# Patient Record
Sex: MALE | Race: OTHER | URBAN - METROPOLITAN AREA
[De-identification: names, ages, dates, MRNs, and addresses within clinical notes are randomized per-mention and may not be internally consistent; named-entity substitution may affect disease eponyms.]

---

## 2019-07-16 ENCOUNTER — OFFICE VISIT (OUTPATIENT)
Dept: FAMILY MEDICINE CLINIC | Age: 34
End: 2019-07-16

## 2019-07-16 VITALS
BODY MASS INDEX: 35.13 KG/M2 | HEART RATE: 85 BPM | WEIGHT: 205.8 LBS | DIASTOLIC BLOOD PRESSURE: 86 MMHG | OXYGEN SATURATION: 98 % | RESPIRATION RATE: 14 BRPM | TEMPERATURE: 99 F | SYSTOLIC BLOOD PRESSURE: 120 MMHG | HEIGHT: 64 IN

## 2019-07-16 DIAGNOSIS — N45.1 ACUTE EPIDIDYMITIS: Primary | ICD-10-CM

## 2019-07-16 DIAGNOSIS — R30.0 DYSURIA: ICD-10-CM

## 2019-07-16 DIAGNOSIS — N50.819 TESTICULAR PAIN: ICD-10-CM

## 2019-07-16 LAB
BILIRUBIN, POC: ABNORMAL
BLOOD URINE, POC: ABNORMAL
CLARITY, POC: ABNORMAL
COLOR, POC: ABNORMAL
GLUCOSE URINE, POC: ABNORMAL
KETONES, POC: ABNORMAL
LEUKOCYTE EST, POC: ABNORMAL
NITRITE, POC: ABNORMAL
PH, POC: 7
PROTEIN, POC: ABNORMAL
SPECIFIC GRAVITY, POC: 1.02
UROBILINOGEN, POC: ABNORMAL

## 2019-07-16 PROCEDURE — 81003 URINALYSIS AUTO W/O SCOPE: CPT | Performed by: NURSE PRACTITIONER

## 2019-07-16 PROCEDURE — 96372 THER/PROPH/DIAG INJ SC/IM: CPT | Performed by: NURSE PRACTITIONER

## 2019-07-16 PROCEDURE — 99213 OFFICE O/P EST LOW 20 MIN: CPT | Performed by: NURSE PRACTITIONER

## 2019-07-16 RX ORDER — CIPROFLOXACIN 500 MG/1
500 TABLET, FILM COATED ORAL 2 TIMES DAILY
Qty: 20 TABLET | Refills: 0 | Status: CANCELLED | OUTPATIENT
Start: 2019-07-16 | End: 2019-07-26

## 2019-07-16 RX ORDER — CEFTRIAXONE SODIUM 250 MG/1
250 INJECTION, POWDER, FOR SOLUTION INTRAMUSCULAR; INTRAVENOUS ONCE
Status: COMPLETED | OUTPATIENT
Start: 2019-07-16 | End: 2019-07-16

## 2019-07-16 RX ORDER — DOXYCYCLINE HYCLATE 100 MG
100 TABLET ORAL 2 TIMES DAILY
Qty: 20 TABLET | Refills: 0 | Status: SHIPPED | OUTPATIENT
Start: 2019-07-16 | End: 2019-07-26

## 2019-07-16 RX ORDER — LEVOFLOXACIN 500 MG/1
500 TABLET, FILM COATED ORAL DAILY
Qty: 10 TABLET | Refills: 0 | Status: CANCELLED | OUTPATIENT
Start: 2019-07-16 | End: 2019-07-26

## 2019-07-16 RX ADMIN — CEFTRIAXONE SODIUM 250 MG: 250 INJECTION, POWDER, FOR SOLUTION INTRAMUSCULAR; INTRAVENOUS at 10:56

## 2019-07-16 ASSESSMENT — ENCOUNTER SYMPTOMS
VOMITING: 0
ABDOMINAL PAIN: 1
SHORTNESS OF BREATH: 0
NAUSEA: 0

## 2019-07-16 NOTE — PROGRESS NOTES
Subjective  Ruy Ervin, 28 y.o. male presents today with:  Chief Complaint   Patient presents with    Dysuria     Pt c/o dysuria, left testicle pain, lower abdominal pain, hematuria, urinary frequency X 1 week. Treatments tried: naproxen with no relief. He is not currently sexually active. He was last sexually active 6 months ago. No discharge. Urinary Tract Infection    This is a new problem. The current episode started in the past 7 days. The problem has been gradually worsening. The pain is at a severity of 6/10. Maximum temperature: subjective fever on sat. He is not sexually active. Associated symptoms include chills, frequency, hematuria and urgency. Pertinent negatives include no discharge, flank pain, nausea or vomiting. Treatments tried: naprosyn. The treatment provided no relief. There is no history of recurrent UTIs. Patient's wife had chlamydia three months ago      No past medical history on file. No Known Allergies    No current outpatient medications on file prior to visit. No current facility-administered medications on file prior to visit. Review of Systems   Constitutional: Positive for chills and fever. Respiratory: Negative for shortness of breath. Cardiovascular: Negative for chest pain. Gastrointestinal: Positive for abdominal pain (lower abdominal pain). Negative for nausea and vomiting. Genitourinary: Positive for dysuria, frequency, hematuria, testicular pain and urgency. Negative for discharge, flank pain, penile pain, penile swelling and scrotal swelling. Objective    Vitals:    07/16/19 1007   BP: 120/86   Site: Right Upper Arm   Position: Sitting   Cuff Size: Medium Adult   Pulse: 85   Resp: 14   Temp: 99 °F (37.2 °C)   TempSrc: Oral   SpO2: 98%   Weight: 205 lb 12.8 oz (93.4 kg)   Height: 5' 3.78\" (1.62 m)       Physical Exam   Constitutional: He appears well-developed. No distress. HENT:   Head: Normocephalic and atraumatic.

## 2019-07-16 NOTE — PATIENT INSTRUCTIONS
actividad a lo que sea cómodo. · Use ropa interior ajustada o un suspensorio deportivo. [place] puede ayudar a reducir el dolor. · Aplíquese frío o calor en la gerald hinchada. Use el que le dé mejores resultados para salinas dolor. Sentarse en un baño de agua tibia por 15 minutos dos veces al día ayudará a reducir la hinchazón con Avonne Ream. · Si le peña dicho que soni STI pudo alessandra causado salinas afección, no tenga relaciones sexuales hasta que salinas médico le diga que ya es seguro. [place] impedirá que se propague la infección. Dígale a salinas charles o parejas sexuales que deben hacerse evaluar. Es posible que necesiten tratamiento. ¿Cuándo debe pedir ayuda? Llame a salinas médico ahora mismo o busque atención médica inmediata si:    · Salinas dolor empeora.     · Tiene fiebre nueva o más libia.     · Tiene nueva o mayor hinchazón en el testículo.     · Tiene un nuevo dolor abdominal o salinas dolor empeora.    Vigile de cerca los cambios en salinas jose francisco y asegúrese de comunicarse con salinas médico si:    · No mejora sanjuanita se esperaba. ¿Dónde puede encontrar más información en inglés? Nahomy Kid a https://ana[facility]. [facility] e ingrese a salinas cuenta de MyChart. Emily Goldman S360 en el cuadro \"Search Health Information\" para más información (en inglés) sobre \"Epididimitis y orquitis: Rohit Ray - [ Epididymitis and Orchitis: Care Instructions ]. \"     Si no tiene soni cuenta, neno sydnee en el enlace \"Sign Up Now\". Revisado: 19 juanpablo, 2018  Versión del contenido: 12.0  © 9335-8276 [employer]. Las instrucciones de cuidado fueron adaptadas bajo licencia por [facility] ([facility]). Si usted tiene [place] afección médica o sobre estas instrucciones, siempre pregunte a salinas profesional de jose francisco. [employer] niega toda garantía o responsabilidad por salinas uso de esta información.

## 2019-07-18 ENCOUNTER — E-PRESCRIBE (OUTPATIENT)
Dept: FAMILY MEDICINE CLINIC | Age: 34
End: 2019-07-18

## 2019-07-18 DIAGNOSIS — N30.00 ACUTE CYSTITIS WITHOUT HEMATURIA: Primary | ICD-10-CM

## 2019-07-18 LAB
ORGANISM: ABNORMAL
URINE CULTURE, ROUTINE: ABNORMAL
URINE CULTURE, ROUTINE: ABNORMAL

## 2019-07-18 RX ORDER — SULFAMETHOXAZOLE AND TRIMETHOPRIM 800; 160 MG/1; MG/1
1 TABLET ORAL 2 TIMES DAILY
Qty: 20 TABLET | Refills: 0 | OUTPATIENT
Start: 2019-07-18 | End: 2019-07-28

## 2019-07-19 LAB
C. TRACHOMATIS DNA ,URINE: NEGATIVE
N. GONORRHOEAE DNA, URINE: NEGATIVE
SPECIMEN SOURCE: NORMAL
T. VAGINALIS AMPLIFIED: NEGATIVE

## 2019-07-29 ENCOUNTER — TELEPHONE (OUTPATIENT)
Dept: FAMILY MEDICINE CLINIC | Age: 34
End: 2019-07-29